# Patient Record
Sex: FEMALE | Race: WHITE | URBAN - METROPOLITAN AREA
[De-identification: names, ages, dates, MRNs, and addresses within clinical notes are randomized per-mention and may not be internally consistent; named-entity substitution may affect disease eponyms.]

---

## 2023-11-22 ENCOUNTER — OFFICE VISIT (OUTPATIENT)
Age: 38
End: 2023-11-22

## 2023-11-22 VITALS
TEMPERATURE: 98.1 F | WEIGHT: 264.2 LBS | SYSTOLIC BLOOD PRESSURE: 105 MMHG | HEIGHT: 64 IN | DIASTOLIC BLOOD PRESSURE: 75 MMHG | OXYGEN SATURATION: 96 % | HEART RATE: 126 BPM | BODY MASS INDEX: 45.11 KG/M2

## 2023-11-22 DIAGNOSIS — U07.1 ACUTE COVID-19: Primary | ICD-10-CM

## 2023-11-22 DIAGNOSIS — R11.2 NAUSEA AND VOMITING, UNSPECIFIED VOMITING TYPE: ICD-10-CM

## 2023-11-22 RX ORDER — METFORMIN HYDROCHLORIDE 500 MG/1
TABLET, EXTENDED RELEASE ORAL
COMMUNITY
Start: 2021-12-02

## 2023-11-22 RX ORDER — ALBUTEROL SULFATE 90 UG/1
2 AEROSOL, METERED RESPIRATORY (INHALATION) 4 TIMES DAILY PRN
Qty: 18 G | Refills: 0 | Status: SHIPPED | OUTPATIENT
Start: 2023-11-22

## 2023-11-22 RX ORDER — CLONIDINE HYDROCHLORIDE 0.1 MG/1
0.1 TABLET ORAL 3 TIMES DAILY PRN
COMMUNITY

## 2023-11-22 RX ORDER — NALTREXONE HYDROCHLORIDE 50 MG/1
TABLET, FILM COATED ORAL
COMMUNITY
Start: 2023-11-19

## 2023-11-22 RX ORDER — ONDANSETRON 2 MG/ML
4 INJECTION INTRAMUSCULAR; INTRAVENOUS ONCE
Status: DISCONTINUED | OUTPATIENT
Start: 2023-11-22 | End: 2023-11-22

## 2023-11-22 RX ORDER — POTASSIUM CHLORIDE 1500 MG/1
20 TABLET, EXTENDED RELEASE ORAL DAILY
COMMUNITY
Start: 2023-10-24

## 2023-11-22 RX ORDER — IBUPROFEN 800 MG/1
800 TABLET ORAL 3 TIMES DAILY PRN
COMMUNITY
Start: 2023-05-30

## 2023-11-22 RX ORDER — FERROUS GLUCONATE 324(38)MG
TABLET ORAL
COMMUNITY

## 2023-11-22 RX ORDER — ATOMOXETINE 80 MG/1
CAPSULE ORAL
COMMUNITY
Start: 2023-06-01

## 2023-11-22 RX ORDER — ONDANSETRON 4 MG/1
4 TABLET, ORALLY DISINTEGRATING ORAL ONCE
Status: SHIPPED | OUTPATIENT
Start: 2023-11-22

## 2023-11-22 RX ORDER — ONDANSETRON HYDROCHLORIDE 4 MG/5ML
4 SOLUTION ORAL ONCE
Status: CANCELLED | OUTPATIENT
Start: 2023-11-22 | End: 2023-11-22

## 2023-11-22 RX ORDER — TIZANIDINE 4 MG/1
4 TABLET ORAL 3 TIMES DAILY
COMMUNITY
Start: 2022-06-24

## 2023-11-23 NOTE — PATIENT INSTRUCTIONS
Basilia French,    It was an absolute pleasure taking care of you today. I'm hoping you'll start feeling better as soon as possible. Please call me if you have any questions or concerns about what we talked about today. Feel free stop back in if anything changes or things seem to be getting worse. If for some reason you develop any serious or potentially life-threatening issues, call 911 or proceed to the nearest emergency department. Hopefully it will never come to that. Otherwise, it was very nice to meet you Basilia French.       Thanks,     Norma Bucio

## 2023-11-23 NOTE — PROGRESS NOTES
Phuong Maloney (:  1985) is a 41 y.o. female, here for evaluation of the following chief complaint(s):  Covid Testing (Body aches, cough, congestion, vomiting, hot and a headache for two days. Exposed to covid. )      ASSESSMENT/PLAN:  Visit Diagnoses and Associated Orders       Acute COVID-19    -  Primary    nirmatrelvir/ritonavir 300/100 (PAXLOVID) 20 x 150 MG & 10 x 100MG TBPK [789827]      albuterol sulfate HFA (VENTOLIN HFA) 108 (90 Base) MCG/ACT inhaler [28942]           Nausea and vomiting, unspecified vomiting type        ondansetron (ZOFRAN-ODT) disintegrating tablet 4 mg [83617]           ORDERS WITHOUT AN ASSOCIATED DIAGNOSIS    atomoxetine (STRATTERA) 80 MG capsule [67676]      cariprazine hcl (VRAYLAR) 1.5 MG capsule [411216]      cloNIDine (CATAPRES) 0.1 MG tablet [7985]      ibuprofen (ADVIL;MOTRIN) 800 MG tablet [8885]      ferrous gluconate (FERGON) 324 (38 Fe) MG tablet [3066]      metFORMIN (GLUCOPHAGE-XR) 500 MG extended release tablet [39839]      naltrexone (DEPADE) 50 MG tablet [85659]      potassium chloride (KLOR-CON M) 20 MEQ TBCR extended release tablet [989736]      tiZANidine (ZANAFLEX) 4 MG tablet [13063]               Summary    - We ran out of COVID tests so I treated this patient based on the fact that she has the same symptoms as her .     - The patient was referred to the Emergency Department for further evaluation and told to proceed there immediately due to vomiting 5 times against having taken promethazine but they declined this directive. - The risks of not going to the Emergency Department which include the possibility of life-threatening outcomes were explained to the patient who still refused to go. - Multiple attempts and a considerable amount of time was used to persuade the patient to seek care at an ER but were ultimately ineffective. - Patient has moderate risk for complications with this disease process.    - Paxlovid therapy was discussed in

## 2024-02-27 DIAGNOSIS — U07.1 ACUTE COVID-19: ICD-10-CM

## 2024-02-27 RX ORDER — ALBUTEROL SULFATE 90 UG/1
2 AEROSOL, METERED RESPIRATORY (INHALATION) 4 TIMES DAILY PRN
Qty: 18 EACH | OUTPATIENT
Start: 2024-02-27